# Patient Record
Sex: FEMALE | Race: WHITE | NOT HISPANIC OR LATINO | URBAN - METROPOLITAN AREA
[De-identification: names, ages, dates, MRNs, and addresses within clinical notes are randomized per-mention and may not be internally consistent; named-entity substitution may affect disease eponyms.]

---

## 2021-11-04 ENCOUNTER — TELEPHONE ENCOUNTER (OUTPATIENT)
Dept: URBAN - METROPOLITAN AREA CLINIC 92 | Facility: CLINIC | Age: 56
End: 2021-11-04

## 2021-11-04 PROBLEM — 64766004: Status: ACTIVE | Noted: 2021-11-04

## 2021-11-05 ENCOUNTER — OFFICE VISIT (OUTPATIENT)
Dept: URBAN - METROPOLITAN AREA TELEHEALTH 2 | Facility: TELEHEALTH | Age: 56
End: 2021-11-05
Payer: SELF-PAY

## 2021-11-05 DIAGNOSIS — R19.7 DIARRHEA, UNSPECIFIED TYPE: ICD-10-CM

## 2021-11-05 DIAGNOSIS — Z87.19 HISTORY OF ULCERATIVE COLITIS: ICD-10-CM

## 2021-11-05 PROCEDURE — 99213 OFFICE O/P EST LOW 20 MIN: CPT | Performed by: INTERNAL MEDICINE

## 2021-11-05 RX ORDER — PREDNISOLONE ORAL 15 MG/5ML
SOLUTION ORAL
Qty: 0 | Refills: 0 | Status: ACTIVE | COMMUNITY
Start: 1900-01-01 | End: 1900-01-01

## 2021-11-05 NOTE — HPI-TODAY'S VISIT:
diagnosed w/ UC in her 20s. in her 50s, started having bad flare ups. a couple years ago had another flare for 3 weeks. my mother passed away in julien, stress. every time she has a flare she takes  steroids. has done apriso in past but did not work fast enough noncompliant in between no regular medication allergies: PCN?? last night went to [facility]--> prednisone 50 mg x 7 tabs. labs were normal, CT looked normal. liquid diarrhea, 6-7 a day some blood mixed in. mucus in stool no abx in last 6 months never had cdiff

## 2021-11-10 ENCOUNTER — OFFICE VISIT (OUTPATIENT)
Dept: URBAN - METROPOLITAN AREA CLINIC 80 | Facility: CLINIC | Age: 56
End: 2021-11-10